# Patient Record
(demographics unavailable — no encounter records)

---

## 2025-03-18 NOTE — HISTORY OF PRESENT ILLNESS
[5] : 5 [4] : 4 [Dull/Aching] : dull/aching [Intermittent] : intermittent [Leisure] : leisure [Meds] : meds [Ice] : ice [Standing] : standing [Walking] : walking

## 2025-03-27 NOTE — HISTORY OF PRESENT ILLNESS
[5] : 5 [4] : 4 [Dull/Aching] : dull/aching [Intermittent] : intermittent [Leisure] : leisure [Meds] : meds [Ice] : ice [Standing] : standing [Walking] : walking [de-identified] : 03/26/2025: Patient is here for LT knee follow up. He still reports some pain. Here to review MRI results. Patient injured his RT 4th finger while trying to catch a ball at school. He went to the school nurse to get it checked out. Is wearing a splint. went to [facility] and got x-rays done.  3/18/25: 13 Y M presents with LT knee pain. States that he fell down while carrying his backpack onto his knee on 3/14/25. Denies numbness/ tingling. No knee pain prior to the fall. Takes Tylenol and applies ice to the affected area.   [] : Post Surgical Visit: no [FreeTextEntry1] : LT knee [FreeTextEntry9] : Tylenol [de-identified] : MRI

## 2025-03-27 NOTE — DATA REVIEWED
[MRI] : MRI [Left] : left [Knee] : knee [Report was reviewed and noted in the chart] : The report was reviewed and noted in the chart [I independently reviewed and interpreted images and report] : I independently reviewed and interpreted images and report [FreeTextEntry1] : MRI of LT knee OC 03/21/2025:  1. Mild distal patellar tendinitis versus strain, moderate subcutaneous edema/swelling ventral to the distal patella tendon insertion and anterior tibial tubercle over an area measuring 4-5cm without well-defined fluid collection or mass and marrow edema within the anterior tibial tubercle measuring 2 cm suggesting stress reaction or contusion. The findings suggest traumatic injury, potentially acute superimposed on chronic, at the distal patella tendon insertion which should be correlated clinically. Clinical follow up is recommended.  2. Slight effusion and tiny popliteal cyst.

## 2025-03-27 NOTE — ASSESSMENT
[FreeTextEntry1] : 03/18/2025: LT knee x-rays, 2 views, reveal no fx/deformities.  Underlying pathology reviewed and treatment options discussed. Obtain MRI LT knee R/O tears.  Treatment options including medications such as NSAIDs discussed. Activity modification as tolerated. Questions addressed. Follow up after MRI.   03/26/2025: MRI fo LT knee reviewed.   Based on my independent interpretation of the MRI images there's a bone contusion. X-rays from [facility] reveals PIP joint fracture.   Underlying pathology and treatment options discussed.  Activity modification as tolerated. Yasir loops were provided. He can continue using a splint.  Needs school note that gives him limitations. no gym, no writing and no playing during recess for 2 weeks. Questions addressed. Follow up PRN.   The documentation recorded by the scribe accurately reflects the service I personally performed and the decisions made by me. I, Jyothi Recio, attest that this documentation has been prepared under the direction and in the presence of Provider Alejo Telles MD.   The patient was seen by Alejo Telles MD.

## 2025-03-27 NOTE — IMAGING
[de-identified] : LT knee:  mild limp anterior superior tenderness  ecchymosis to superior femoral condyle.   tibial tubercle tenderness  RT 4th digit finger. decrease rom of ring finger to PIP joint  tenderness and swelling to ring finger.

## 2025-03-27 NOTE — ASSESSMENT
[FreeTextEntry1] : 03/18/2025: LT knee x-rays, 2 views, reveal no fx/deformities.  Underlying pathology reviewed and treatment options discussed. Obtain MRI LT knee R/O tears.  Treatment options including medications such as NSAIDs discussed. Activity modification as tolerated. Questions addressed. Follow up after MRI.   03/26/2025: MRI fo LT knee reviewed.   Based on my independent interpretation of the MRI images there's a bone contusion. [facility] joint fracture.   Underlying pathology and treatment options discussed.  Activity modification as tolerated. Yasir loops were provided. He can continue using a splint.  Needs school note that gives him limitations. no gym, no writing and no playing during recess for 2 weeks. Questions addressed. Follow up PRN.   The documentation recorded by the scribe accurately reflects the service I personally performed and the decisions made by me. I, Jyothi Recio, attest that this documentation has been prepared under the direction and in the presence of Provider Alejo Telles MD.   The patient was seen by Alejo Telles MD.

## 2025-03-27 NOTE — IMAGING
[de-identified] : LT knee:  mild limp anterior superior tenderness  ecchymosis to superior femoral condyle.   tibial tubercle tenderness  RT 4th digit finger. decrease rom of ring finger to PIP joint  tenderness and swelling to ring finger.

## 2025-03-27 NOTE — HISTORY OF PRESENT ILLNESS
[5] : 5 [4] : 4 [Dull/Aching] : dull/aching [Intermittent] : intermittent [Leisure] : leisure [Meds] : meds [Ice] : ice [Standing] : standing [Walking] : walking [de-identified] : 03/26/2025: Patient is here for LT knee follow up. He still reports some pain. Here to review MRI results. Patient injured his RT 4th finger while trying to catch a ball at school. He went to the school nurse to get it checked out. Is wearing a splint. went to [facility] and got x-rays done.  3/18/25: 13 Y M presents with LT knee pain. States that he fell down while carrying his backpack onto his knee on 3/14/25. Denies numbness/ tingling. No knee pain prior to the fall. Takes Tylenol and applies ice to the affected area.   [] : Post Surgical Visit: no [FreeTextEntry1] : LT knee [FreeTextEntry9] : Tylenol [de-identified] : MRI

## 2025-03-27 NOTE — HISTORY OF PRESENT ILLNESS
[5] : 5 [4] : 4 [Dull/Aching] : dull/aching [Intermittent] : intermittent [Leisure] : leisure [Meds] : meds [Ice] : ice [Standing] : standing [Walking] : walking [de-identified] : 03/26/2025: Patient is here for LT knee follow up. He still reports some pain. Here to review MRI results. Patient injured his RT 4th finger while trying to catch a ball at school. He went to the school nurse to get it checked out. Is wearing a splint. went to [facility] and got x-rays done.  3/18/25: 13 Y M presents with LT knee pain. States that he fell down while carrying his backpack onto his knee on 3/14/25. Denies numbness/ tingling. No knee pain prior to the fall. Takes Tylenol and applies ice to the affected area.   [] : Post Surgical Visit: no [FreeTextEntry1] : LT knee [FreeTextEntry9] : Tylenol [de-identified] : MRI

## 2025-03-27 NOTE — ASSESSMENT
[FreeTextEntry1] : 03/18/2025: LT knee x-rays, 2 views, reveal no fx/deformities.  Underlying pathology reviewed and treatment options discussed. Obtain MRI LT knee R/O tears.  Treatment options including medications such as NSAIDs discussed. Activity modification as tolerated. Questions addressed. Follow up after MRI.   03/26/2025: MRI fo LT knee reviewed.   Based on my independent interpretation of the MRI images there's a bone contusion. X-rays from [facility] reveals PIP joint fracture.   Underlying pathology and treatment options discussed.  Activity modification as tolerated. Yasir loops were provided. He can continue using a splint.  Needs school note that gives him limitations. no gym, no writing and no playing during recess for 2 weeks. Questions addressed. Follow up PRN.   The documentation recorded by the scribe accurately reflects the service I personally performed and the decisions made by me. I, Jyothi Recio, attest that this documentation has been prepared under the direction and in the presence of Provider Alejo Telles MD.   The patient was seen by Alejo Telles MD.

## 2025-03-27 NOTE — IMAGING
[de-identified] : LT knee:  mild limp anterior superior tenderness  ecchymosis to superior femoral condyle.   tibial tubercle tenderness  RT 4th digit finger. decrease rom of ring finger to PIP joint  tenderness and swelling to ring finger.